# Patient Record
Sex: FEMALE | NOT HISPANIC OR LATINO | URBAN - METROPOLITAN AREA
[De-identification: names, ages, dates, MRNs, and addresses within clinical notes are randomized per-mention and may not be internally consistent; named-entity substitution may affect disease eponyms.]

---

## 2024-09-23 ENCOUNTER — HOSPITAL ENCOUNTER (OUTPATIENT)
Dept: CARDIOLOGY | Facility: HOSPITAL | Age: 29
Discharge: HOME | End: 2024-09-23
Payer: COMMERCIAL

## 2024-09-23 ENCOUNTER — APPOINTMENT (OUTPATIENT)
Dept: RADIOLOGY | Facility: HOSPITAL | Age: 29
End: 2024-09-23
Payer: COMMERCIAL

## 2024-09-23 ENCOUNTER — HOSPITAL ENCOUNTER (EMERGENCY)
Facility: HOSPITAL | Age: 29
Discharge: HOME | End: 2024-09-23
Payer: COMMERCIAL

## 2024-09-23 VITALS
OXYGEN SATURATION: 97 % | BODY MASS INDEX: 35.28 KG/M2 | DIASTOLIC BLOOD PRESSURE: 74 MMHG | HEART RATE: 103 BPM | SYSTOLIC BLOOD PRESSURE: 149 MMHG | RESPIRATION RATE: 18 BRPM | HEIGHT: 59 IN | TEMPERATURE: 98.1 F | WEIGHT: 175 LBS

## 2024-09-23 LAB
ALBUMIN SERPL BCP-MCNC: 3.8 G/DL (ref 3.4–5)
ALP SERPL-CCNC: 135 U/L (ref 33–110)
ALT SERPL W P-5'-P-CCNC: 69 U/L (ref 7–45)
ANION GAP SERPL CALC-SCNC: 13 MMOL/L (ref 10–20)
AST SERPL W P-5'-P-CCNC: 23 U/L (ref 9–39)
BASOPHILS # BLD AUTO: 0.04 X10*3/UL (ref 0–0.1)
BASOPHILS NFR BLD AUTO: 0.5 %
BILIRUB SERPL-MCNC: 0.4 MG/DL (ref 0–1.2)
BNP SERPL-MCNC: 85 PG/ML (ref 0–99)
BUN SERPL-MCNC: 13 MG/DL (ref 6–23)
CALCIUM SERPL-MCNC: 8.8 MG/DL (ref 8.6–10.3)
CARDIAC TROPONIN I PNL SERPL HS: 6 NG/L (ref 0–13)
CHLORIDE SERPL-SCNC: 104 MMOL/L (ref 98–107)
CO2 SERPL-SCNC: 26 MMOL/L (ref 21–32)
CREAT SERPL-MCNC: 0.72 MG/DL (ref 0.5–1.05)
EGFRCR SERPLBLD CKD-EPI 2021: >90 ML/MIN/1.73M*2
EOSINOPHIL # BLD AUTO: 0.22 X10*3/UL (ref 0–0.7)
EOSINOPHIL NFR BLD AUTO: 2.6 %
ERYTHROCYTE [DISTWIDTH] IN BLOOD BY AUTOMATED COUNT: 14.3 % (ref 11.5–14.5)
GLUCOSE SERPL-MCNC: 109 MG/DL (ref 74–99)
HCT VFR BLD AUTO: 38.3 % (ref 36–46)
HGB BLD-MCNC: 12.8 G/DL (ref 12–16)
IMM GRANULOCYTES # BLD AUTO: 0.03 X10*3/UL (ref 0–0.7)
IMM GRANULOCYTES NFR BLD AUTO: 0.4 % (ref 0–0.9)
LYMPHOCYTES # BLD AUTO: 1.57 X10*3/UL (ref 1.2–4.8)
LYMPHOCYTES NFR BLD AUTO: 18.7 %
MAGNESIUM SERPL-MCNC: 2.11 MG/DL (ref 1.6–2.4)
MCH RBC QN AUTO: 28.8 PG (ref 26–34)
MCHC RBC AUTO-ENTMCNC: 33.4 G/DL (ref 32–36)
MCV RBC AUTO: 86 FL (ref 80–100)
MONOCYTES # BLD AUTO: 0.45 X10*3/UL (ref 0.1–1)
MONOCYTES NFR BLD AUTO: 5.4 %
NEUTROPHILS # BLD AUTO: 6.09 X10*3/UL (ref 1.2–7.7)
NEUTROPHILS NFR BLD AUTO: 72.4 %
NRBC BLD-RTO: 0 /100 WBCS (ref 0–0)
PLATELET # BLD AUTO: 338 X10*3/UL (ref 150–450)
POTASSIUM SERPL-SCNC: 3.9 MMOL/L (ref 3.5–5.3)
PROT SERPL-MCNC: 7 G/DL (ref 6.4–8.2)
RBC # BLD AUTO: 4.45 X10*6/UL (ref 4–5.2)
SODIUM SERPL-SCNC: 139 MMOL/L (ref 136–145)
WBC # BLD AUTO: 8.4 X10*3/UL (ref 4.4–11.3)

## 2024-09-23 PROCEDURE — 99283 EMERGENCY DEPT VISIT LOW MDM: CPT | Mod: 25

## 2024-09-23 PROCEDURE — 93005 ELECTROCARDIOGRAM TRACING: CPT

## 2024-09-23 PROCEDURE — 84484 ASSAY OF TROPONIN QUANT: CPT | Performed by: NURSE PRACTITIONER

## 2024-09-23 PROCEDURE — 83880 ASSAY OF NATRIURETIC PEPTIDE: CPT | Performed by: NURSE PRACTITIONER

## 2024-09-23 PROCEDURE — 83735 ASSAY OF MAGNESIUM: CPT | Performed by: NURSE PRACTITIONER

## 2024-09-23 PROCEDURE — 71046 X-RAY EXAM CHEST 2 VIEWS: CPT | Performed by: STUDENT IN AN ORGANIZED HEALTH CARE EDUCATION/TRAINING PROGRAM

## 2024-09-23 PROCEDURE — 36415 COLL VENOUS BLD VENIPUNCTURE: CPT | Performed by: NURSE PRACTITIONER

## 2024-09-23 PROCEDURE — 80053 COMPREHEN METABOLIC PANEL: CPT | Performed by: NURSE PRACTITIONER

## 2024-09-23 PROCEDURE — 85025 COMPLETE CBC W/AUTO DIFF WBC: CPT | Performed by: NURSE PRACTITIONER

## 2024-09-23 PROCEDURE — 71046 X-RAY EXAM CHEST 2 VIEWS: CPT

## 2024-09-23 ASSESSMENT — COLUMBIA-SUICIDE SEVERITY RATING SCALE - C-SSRS
1. IN THE PAST MONTH, HAVE YOU WISHED YOU WERE DEAD OR WISHED YOU COULD GO TO SLEEP AND NOT WAKE UP?: NO
6. HAVE YOU EVER DONE ANYTHING, STARTED TO DO ANYTHING, OR PREPARED TO DO ANYTHING TO END YOUR LIFE?: NO
2. HAVE YOU ACTUALLY HAD ANY THOUGHTS OF KILLING YOURSELF?: NO

## 2024-09-23 NOTE — ED TRIAGE NOTES
Secondary to patient volumes and overcrowding, I performed a brief medical screening exam of the patient in triage, as the patient awaits space in the main ED.    History of Present Illness:  Carlee Wan presents with   Chief Complaint   Patient presents with    Chest Pain   Patient with recent  6 days ago.    Physical Exam:  General - In no acute distress  Respiratory - Breathing comfortably  Cardiac - Normal S1, S2, no m/g/r  Neurologic - Moving all extremities  Abdomen -bowel sounds present, no CVAT, nontender    Medical Decision Making:  Patient will require further evaluation in the main ED.    Initial diagnostic tests were ordered from triage.      The patient demonstrates understanding that this initial evaluation is a brief medical screening exam and the expectation is that they await for space in the main ED to be further evaluated.  The patient understands that, if they leave prior to further evaluation in the main ED after this initial evaluation in triage, they are doing so under their own accord knowing that their evaluation/work-up is not yet complete. The patient also understands that any preliminary diagnostic results, including abnormalities, may not be shared with them, if they choose to leave prior to further evaluation in the main ED.

## 2024-09-25 LAB
ATRIAL RATE: 103 BPM
P AXIS: 50 DEGREES
PR INTERVAL: 123 MS
Q ONSET: 249 MS
QRS COUNT: 17 BEATS
QRS DURATION: 88 MS
QT INTERVAL: 325 MS
QTC CALCULATION(BAZETT): 426 MS
QTC FREDERICIA: 389 MS
R AXIS: 52 DEGREES
T AXIS: 39 DEGREES
T OFFSET: 412 MS
VENTRICULAR RATE: 103 BPM